# Patient Record
(demographics unavailable — no encounter records)

---

## 2025-06-18 NOTE — HISTORY OF PRESENT ILLNESS
[FreeTextEntry1] : Language: English Accompanied by: Self Contact info: [phone] Referring Provider/PCP: Victor Hugo Avila   Initial H&P 06/18/2025: Mr. SNOW is a very pleasant 43 year-old gentleman who presents today for initial evaluation of   When he was an adolescent, he had an orthiectomy 2/2 torsion.  Has a prosthesis in place.   Then saw Dr. Delacruz about 6-7y ago for severe scrotal/testicular pain.  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  Pain did not resolve after a while, therefore he was taken to the OR for removal.   Started to have pain about 2 weeks ago again, saw another urologist (Dr. Mccauley) and was started on Mobic and rec'd to undergo FARHAN. Pain currently is on the right, describes it as a lingering discomfort, which varies in intensity.  Pain occasionally radiates to right groin and medial thigh.  Denies urinary symptoms (maybe very slightly different).  Has not had an erection in the last week and half due to this pain/distress. Denies constipation.  Denies swelling/erythema.  Mobic started to work after first 3d, but feels that some of the benefits have faded since then.  Denies recent pain with erections, ejaculation or following ejaculation. No exacerbating factors.  Jogged recently and didn't feel any worse.  No alleviating factors. No particular time of day when its worse.  Possibly slightly better in the mornings.    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 --------------------------------------------------------------------------------------------------------------------------------------- Physical Exam: Chaperone: General: NAD, sitting on exam table comfortably HEENT: NCAT, EOMI Resp: breathing comfortably on RA, b/l symm chest rise Cardiac: RRR Abd: SNTND, scars: Back: (-) CVAT b/l MSK: BROCK cabrera/ FROM Psych: appropriate affect : normal appearing circumcised phallus, plaque:____,  b/l desc testes, no testicular masses or lesions, b/l vas palpable --------------------------------------------------------------------------------------------------------------------------------------- Results:   --------------------------------------------------------------------------------------------------------------------------------------- A/P: 43M with